# Patient Record
Sex: MALE | Race: OTHER | HISPANIC OR LATINO | Employment: FULL TIME | URBAN - METROPOLITAN AREA
[De-identification: names, ages, dates, MRNs, and addresses within clinical notes are randomized per-mention and may not be internally consistent; named-entity substitution may affect disease eponyms.]

---

## 2017-02-11 ENCOUNTER — HOSPITAL ENCOUNTER (OUTPATIENT)
Dept: LAB | Facility: MEDICAL CENTER | Age: 32
End: 2017-02-11
Attending: INTERNAL MEDICINE
Payer: COMMERCIAL

## 2017-02-11 DIAGNOSIS — E55.9 VITAMIN D DEFICIENCY: ICD-10-CM

## 2017-02-11 DIAGNOSIS — N46.9 INFERTILITY MALE: ICD-10-CM

## 2017-02-11 DIAGNOSIS — R19.7 DIARRHEA, UNSPECIFIED TYPE: ICD-10-CM

## 2017-02-11 DIAGNOSIS — N52.9 ERECTILE DYSFUNCTION, UNSPECIFIED ERECTILE DYSFUNCTION TYPE: ICD-10-CM

## 2017-02-11 LAB
25(OH)D3 SERPL-MCNC: 35 NG/ML (ref 30–100)
ALBUMIN SERPL BCP-MCNC: 4.4 G/DL (ref 3.2–4.9)
ALBUMIN/GLOB SERPL: 1.1 G/DL
ALP SERPL-CCNC: 73 U/L (ref 30–99)
ALT SERPL-CCNC: 71 U/L (ref 2–50)
ANION GAP SERPL CALC-SCNC: 9 MMOL/L (ref 0–11.9)
AST SERPL-CCNC: 35 U/L (ref 12–45)
BASOPHILS # BLD AUTO: 0.04 K/UL (ref 0–0.12)
BASOPHILS NFR BLD AUTO: 0.4 % (ref 0–1.8)
BILIRUB SERPL-MCNC: 0.8 MG/DL (ref 0.1–1.5)
BUN SERPL-MCNC: 13 MG/DL (ref 8–22)
CALCIUM SERPL-MCNC: 9.8 MG/DL (ref 8.5–10.5)
CHLORIDE SERPL-SCNC: 105 MMOL/L (ref 96–112)
CO2 SERPL-SCNC: 26 MMOL/L (ref 20–33)
CREAT SERPL-MCNC: 0.88 MG/DL (ref 0.5–1.4)
EOSINOPHIL # BLD: 0.15 K/UL (ref 0–0.51)
EOSINOPHIL NFR BLD AUTO: 1.7 % (ref 0–6.9)
ERYTHROCYTE [DISTWIDTH] IN BLOOD BY AUTOMATED COUNT: 43.8 FL (ref 35.9–50)
G LAMBLIA+C PARVUM AG STL QL RAPID: NORMAL
GLOBULIN SER CALC-MCNC: 4 G/DL (ref 1.9–3.5)
GLUCOSE SERPL-MCNC: 112 MG/DL (ref 65–99)
HCT VFR BLD AUTO: 53.6 % (ref 42–52)
HGB BLD-MCNC: 17.6 G/DL (ref 14–18)
IMM GRANULOCYTES # BLD AUTO: 0.04 K/UL (ref 0–0.11)
IMM GRANULOCYTES NFR BLD AUTO: 0.4 % (ref 0–0.9)
LYMPHOCYTES # BLD: 3.43 K/UL (ref 1–4.8)
LYMPHOCYTES NFR BLD AUTO: 38.2 % (ref 22–41)
MCH RBC QN AUTO: 30.5 PG (ref 27–33)
MCHC RBC AUTO-ENTMCNC: 32.8 G/DL (ref 33.7–35.3)
MCV RBC AUTO: 92.9 FL (ref 81.4–97.8)
MONOCYTES # BLD: 0.57 K/UL (ref 0–0.85)
MONOCYTES NFR BLD AUTO: 6.3 % (ref 0–13.4)
NEUTROPHILS # BLD: 4.75 K/UL (ref 1.82–7.42)
NEUTROPHILS NFR BLD AUTO: 53 % (ref 44–72)
NRBC # BLD AUTO: 0 K/UL
NRBC BLD-RTO: 0 /100 WBC
PLATELET # BLD AUTO: 345 K/UL (ref 164–446)
PMV BLD AUTO: 9.4 FL (ref 9–12.9)
POTASSIUM SERPL-SCNC: 4 MMOL/L (ref 3.6–5.5)
PROLACTIN SERPL-MCNC: 25.6 NG/ML (ref 2.1–17.7)
PROT SERPL-MCNC: 8.4 G/DL (ref 6–8.2)
RBC # BLD AUTO: 5.77 M/UL (ref 4.7–6.1)
SIGNIFICANT IND 70042: NORMAL
SODIUM SERPL-SCNC: 140 MMOL/L (ref 135–145)
SOURCE SOURCE: NORMAL
T4 FREE SERPL-MCNC: 0.86 NG/DL (ref 0.53–1.43)
TESTOST SERPL-MCNC: 348 NG/DL (ref 175–781)
TSH SERPL DL<=0.005 MIU/L-ACNC: 1.72 UIU/ML (ref 0.3–3.7)
WBC # BLD AUTO: 9 K/UL (ref 4.8–10.8)
WBC STL QL MICRO: NORMAL

## 2017-02-11 PROCEDURE — 84446 ASSAY OF VITAMIN E: CPT

## 2017-02-11 PROCEDURE — 36415 COLL VENOUS BLD VENIPUNCTURE: CPT

## 2017-02-11 PROCEDURE — 87045 FECES CULTURE AEROBIC BACT: CPT

## 2017-02-11 PROCEDURE — 84443 ASSAY THYROID STIM HORMONE: CPT

## 2017-02-11 PROCEDURE — 87899 AGENT NOS ASSAY W/OPTIC: CPT

## 2017-02-11 PROCEDURE — 84630 ASSAY OF ZINC: CPT

## 2017-02-11 PROCEDURE — 85025 COMPLETE CBC W/AUTO DIFF WBC: CPT

## 2017-02-11 PROCEDURE — 82306 VITAMIN D 25 HYDROXY: CPT

## 2017-02-11 PROCEDURE — 87328 CRYPTOSPORIDIUM AG IA: CPT

## 2017-02-11 PROCEDURE — 80053 COMPREHEN METABOLIC PANEL: CPT

## 2017-02-11 PROCEDURE — 87046 STOOL CULTR AEROBIC BACT EA: CPT

## 2017-02-11 PROCEDURE — 84146 ASSAY OF PROLACTIN: CPT

## 2017-02-11 PROCEDURE — 87329 GIARDIA AG IA: CPT

## 2017-02-11 PROCEDURE — 83516 IMMUNOASSAY NONANTIBODY: CPT | Mod: 91

## 2017-02-11 PROCEDURE — 84439 ASSAY OF FREE THYROXINE: CPT

## 2017-02-11 PROCEDURE — 82784 ASSAY IGA/IGD/IGG/IGM EACH: CPT

## 2017-02-11 PROCEDURE — 89055 LEUKOCYTE ASSESSMENT FECAL: CPT

## 2017-02-11 PROCEDURE — 84403 ASSAY OF TOTAL TESTOSTERONE: CPT

## 2017-02-12 LAB
E COLI SXT1+2 STL IA: NORMAL
SIGNIFICANT IND 70042: NORMAL
SOURCE SOURCE: NORMAL

## 2017-02-14 LAB
A-TOCOPHEROL VIT E SERPL-MCNC: 8.9 MG/L (ref 5.5–18)
BACTERIA STL CULT: NORMAL
BETA+GAMMA TOCOPHEROL SERPL-MCNC: 0.9 MG/L (ref 0–6)
E COLI SXT1+2 STL IA: NORMAL
IGA SERPL-MCNC: 525 MG/DL (ref 68–408)
SIGNIFICANT IND 70042: NORMAL
SOURCE SOURCE: NORMAL
TTG IGA SER IA-ACNC: 1 U/ML (ref 0–3)
ZINC BLD-MCNC: 722 UG/DL (ref 440–860)

## 2017-02-15 ENCOUNTER — OFFICE VISIT (OUTPATIENT)
Dept: MEDICAL GROUP | Facility: CLINIC | Age: 32
End: 2017-02-15
Payer: COMMERCIAL

## 2017-02-15 VITALS
DIASTOLIC BLOOD PRESSURE: 70 MMHG | HEART RATE: 70 BPM | WEIGHT: 165 LBS | TEMPERATURE: 98.7 F | OXYGEN SATURATION: 98 % | SYSTOLIC BLOOD PRESSURE: 112 MMHG | BODY MASS INDEX: 29.23 KG/M2 | HEIGHT: 63 IN | RESPIRATION RATE: 16 BRPM

## 2017-02-15 DIAGNOSIS — R79.89 ELEVATED PROLACTIN LEVEL: ICD-10-CM

## 2017-02-15 DIAGNOSIS — R73.9 ELEVATED BLOOD SUGAR: ICD-10-CM

## 2017-02-15 DIAGNOSIS — E55.9 VITAMIN D DEFICIENCY: ICD-10-CM

## 2017-02-15 DIAGNOSIS — R74.01 ELEVATED ALANINE AMINOTRANSFERASE (ALT) LEVEL: ICD-10-CM

## 2017-02-15 LAB
HBA1C MFR BLD: NORMAL % (ref ?–5.8)
INT CON NEG: NEGATIVE
INT CON POS: POSITIVE

## 2017-02-15 PROCEDURE — 83036 HEMOGLOBIN GLYCOSYLATED A1C: CPT | Performed by: PHYSICIAN ASSISTANT

## 2017-02-15 PROCEDURE — 99214 OFFICE O/P EST MOD 30 MIN: CPT | Performed by: PHYSICIAN ASSISTANT

## 2017-02-15 NOTE — PROGRESS NOTES
Subjective:   CC: Daryl Cheung is a 31 y.o. Male, Northern Irish-speaking, here today for lab follow up, and the following:      Last lab reveals elevated ALT. Patient denies any right upper quadrant pain. Patient does have chronic occasional abdominal discomfort with diarrhea that is not related to food. No diarrhea today. Tissue transglutaminase antibody results were negative at this point.   He has elevated prolactin, denies any breast discharge. I believe prolactin was ordered to evaluate patient's erectile dysfunction.  Patient had elevated fasting blood sugar with family history of diabetes. Denies any polyuria polydipsia or blurry vision.  Patient used to take 5000 vitamin D international units daily. However currently is not taking any vitamin D. Last vitamin D level--> 35 WNL.     Current medicines (including changes today)  Current Outpatient Prescriptions   Medication Sig Dispense Refill   • diphenoxylate-atropine (LOMOTIL) 2.5-0.025 MG Tab Take 2 Tabs by mouth 4 times a day as needed for Diarrhea. 24 Tab 1   • scopolamine (TRANSDERM-SCOP) 1.5 MG/3DAYS PATCH 72 HR Apply 1 Patch to skin as directed every 72 hours. 4 Patch 0   • albuterol (PROAIR HFA) 108 (90 BASE) MCG/ACT Aero Soln inhalation aerosol Inhale 2 Puffs by mouth every four hours as needed for Shortness of Breath. 8.5 g 0   • promethazine-codeine (PHENERGAN-CODEINE) 6.25-10 MG/5ML Syrup Take 5-10 mL by mouth 3 times a day as needed for Cough (or use qhs). 150 mL 0   • aluminum chloride (DRYSOL) 20 % external solution Apply at night 2-3 days or until sweating has stopped. Avoid using non-broken, irritated or recently shaved the skin. 1 Bottle 3     No current facility-administered medications for this visit.         Past medical, surgical, family, and social history are reviewed in Epic chart by me today.   Medications and allergies reviewed in Epic chart by me today.         ROS   No chest pain, no shortness of breath,   As documented  "in history of present illness above     Objective:     Blood pressure 112/70, pulse 70, temperature 37.1 °C (98.7 °F), resp. rate 16, height 1.6 m (5' 3\"), weight 74.844 kg (165 lb), SpO2 98 %. Body mass index is 29.24 kg/(m^2).   Physical Exam:  Constitutional: Alert, oriented in no acute distress.  Psych: Eye contact is good, speech goal directed, affect calm  Eyes: Conjunctiva non-injected, sclera non-icteric.  Lungs: Unlabored respiratory effort, clear to auscultation bilaterally with good excursion, no wheez or rhonci  CV: regular rate and rhythm. No lower extremity edema  Skin: no lesions in visible areas.        Assessment and Plan:   The following treatment plan was discussed    1. Elevated prolactin level (CMS-[facility])  Prolactin was elevated. Repeat labs in 12 weeks to reevaluate  - PROLACTIN    2. Elevated alanine aminotransferase (ALT) level  - COMP METABOLIC PANEL; Future    3. Elevated blood sugar  - COMP METABOLIC PANEL; Future  - POCT  A1C --> 5.9    4. Vitamin D deficiency  Resolved. Vitamin D level is back to normal.       Followup: Return if symptoms worsen or fail to improve.         Please note that this dictation was created using voice recognition software. I have made every reasonable attempt to correct obvious errors, but I expect that there are errors of grammar and possibly content that I did not discover before finalizing the note.    "

## 2017-02-15 NOTE — MR AVS SNAPSHOT
"        Daryl Stewart Perez   2/15/2017 7:45 AM   Office Visit   MRN: [id number]    Department:  [facility]   Dept Phone:  [phone]    Description:  Male : 1985   Provider:  Helga Victoria PA-C           Reason for Visit     Follow-Up [facility] Labs       Allergies as of 2/15/2017     No Known Allergies      You were diagnosed with     Elevated prolactin level (CMS-[facility])   [468799]       Elevated alanine aminotransferase (ALT) level   [837206]       Elevated blood sugar   [765119]       Vitamin D deficiency   [7898501]         Vital Signs     Blood Pressure Pulse Temperature Respirations Height Weight    112/70 mmHg 70 37.1 °C (98.7 °F) 16 1.6 m (5' 3\") 74.844 kg (165 lb)    Body Mass Index Oxygen Saturation Smoking Status             29.24 kg/m2 98% Never Smoker          Basic Information     Date Of Birth Sex Race Ethnicity Preferred Language    1985 Male Other  Origin (Wol[place],Citizen of [place]e,[place]anian, etc) English      Problem List              ICD-10-CM Priority Class Noted - Resolved    Family history of diabetes mellitus Z83.3   2015 - Present    Gastroesophageal reflux disease without esophagitis K21.9   2015 - Present      Health Maintenance        Date Due Completion Dates    IMM DTaP/Tdap/Td Vaccine (1 - Tdap) 2003 ---    IMM INFLUENZA (1) 2016 ---            Results     POCT  A1C      Component    Glycohemoglobin    5.9%    Internal Control Negative    Negative    Internal Control Positive    Positive                        Current Immunizations     No immunizations on file.      Below and/or attached are the medications your provider expects you to take. Review all of your home medications and newly ordered medications with your provider and/or pharmacist. Follow medication instructions as directed by your provider and/or pharmacist. Please keep your medication list with you and share with your provider. Update the information when " medications are discontinued, doses are changed, or new medications (including over-the-counter products) are added; and carry medication information at all times in the event of emergency situations     Allergies:  No Known Allergies          Medications  Valid as of: February 15, 2017 -  8:57 AM    Generic Name Brand Name Tablet Size Instructions for use    Albuterol Sulfate (Aero Soln) albuterol 108 (90 BASE) MCG/ACT Inhale 2 Puffs by mouth every four hours as needed for Shortness of Breath.        Aluminum Chloride (Solution) DRYSOL 20 % Apply at night 2-3 days or until sweating has stopped. Avoid using non-broken, irritated or recently shaved the skin.        Diphenoxylate-Atropine (Tab) LOMOTIL 2.5-0.025 MG Take 2 Tabs by mouth 4 times a day as needed for Diarrhea.        Promethazine-Codeine (Syrup) PHENERGAN-CODEINE 6.25-10 MG/5ML Take 5-10 mL by mouth 3 times a day as needed for Cough (or use qhs).        Scopolamine Base (PATCH 72 HR) TRANSDERM-SCOP 1.5 MG/3DAYS Apply 1 Patch to skin as directed every 72 hours.        .                 Medicines prescribed today were sent to:     [facility] [id number] [address] [address]    Phone: [phone] Fax: [phone]    Open 24 Hours?: No      Medication refill instructions:       If your prescription bottle indicates you have medication refills left, it is not necessary to call your provider’s office. Please contact your pharmacy and they will refill your medication.    If your prescription bottle indicates you do not have any refills left, you may request refills at any time through one of the following ways: The online Cloud[employer] system (except Urgent Care), by calling your provider’s office, or by asking your pharmacy to contact your provider’s office with a refill request. Medication refills are processed only during regular business hours and may not be available until the next business day. Your provider may request  additional information or to have a follow-up visit with you prior to refilling your medication.   *Please Note: Medication refills are assigned a new Rx number when refilled electronically. Your pharmacy may indicate that no refills were authorized even though a new prescription for the same medication is available at the pharmacy. Please request the medicine by name with the pharmacy before contacting your provider for a refill.        Your To Do List     Future Labs/Procedures Complete By Expires    COMP METABOLIC PANEL  As directed 2/16/2018         Forensic Logic Access Code: [email or web address]  Expires: 3/2/2017 12:17 PM    Forensic Logic  A secure, online tool to manage your health information     [facility]’s Forensic Logic® is a secure, online tool that connects you to your personalized health information from the privacy of your home -- day or night - making it very easy for you to manage your healthcare. Once the activation process is completed, you can even access your medical information using the Forensic Logic nahun, which is available for free in the Apple Nahun store or Google Play store.     Forensic Logic provides the following levels of access (as shown below):   My Chart Features   [facility] Doctor [facility]  Special[facility]  Urgent  Care [facility]  Primary Care  Doctor   Email your healthcare team securely and privately 24/7 X X X    Manage appointments: schedule your next appointment; view details of past/upcoming appointments X      Request prescription refills. X      View recent personal medical records, including lab and immunizations X X X X   View health record, including health history, allergies, medications X X X X   Read reports about your outpatient visits, procedures, consult and ER notes X X X X   See your discharge summary, which is a recap of your hospital and/or ER visit that includes your diagnosis, lab results, and care plan. X X       How to register for Forensic Logic:  1. Go to   [email or web address].  2. Click on the Sign Up Now box, which takes you to the New Member Sign Up page. You will need to provide the following information:  a. Enter your Macromill Access Code exactly as it appears at the top of this page. (You will not need to use this code after you’ve completed the sign-up process. If you do not sign up before the expiration date, you must request a new code.)   b. Enter your date of birth.   c. Enter your home email address.   d. Click Submit, and follow the next screen’s instructions.  3. Create a Macromill ID. This will be your Macromill login ID and cannot be changed, so think of one that is secure and easy to remember.  4. Create a pic5t password. You can change your password at any time.  5. Enter your Password Reset Question and Answer. This can be used at a later time if you forget your password.   6. Enter your e-mail address. This allows you to receive e-mail notifications when new information is available in Macromill.  7. Click Sign Up. You can now view your health information.    For assistance activating your Macromill account, call [phone]

## 2017-02-16 ENCOUNTER — TELEPHONE (OUTPATIENT)
Dept: MEDICAL GROUP | Facility: CLINIC | Age: 32
End: 2017-02-16

## 2017-02-17 NOTE — TELEPHONE ENCOUNTER
Pt's wife called back.    Per pt's wife pt was seen yesterday 2/15/17 by Helga to discuss labs and treatment.    Pt's also wanted to inform Dr. Townsend, that patient was tested for his blood sugar and his rusults came back normal.

## 2017-02-17 NOTE — TELEPHONE ENCOUNTER
----- Message from Brittani Townsend M.D. sent at 2/15/2017  5:51 PM PST -----  Please inform patient of abnormal labs, make appointment to be seen for evaluation and discussion.

## 2021-07-09 ENCOUNTER — HOSPITAL ENCOUNTER (EMERGENCY)
Facility: MEDICAL CENTER | Age: 36
End: 2021-07-09
Payer: COMMERCIAL

## 2021-07-09 VITALS
WEIGHT: 173.94 LBS | BODY MASS INDEX: 30.82 KG/M2 | SYSTOLIC BLOOD PRESSURE: 115 MMHG | RESPIRATION RATE: 16 BRPM | OXYGEN SATURATION: 96 % | DIASTOLIC BLOOD PRESSURE: 69 MMHG | HEART RATE: 67 BPM | TEMPERATURE: 97.7 F | HEIGHT: 63 IN

## 2021-07-09 PROCEDURE — 302449 STATCHG TRIAGE ONLY (STATISTIC)

## 2021-07-10 NOTE — ED TRIAGE NOTES
"Chief Complaint   Patient presents with   • Heat Exposure     Per pt family member, pt works construction and outside most times and symptoms seem to be worsened during extended outside heat exposure.   • Altered Mental Status     Per pt family member, pt experience acute confusion today and an episode last month where hismemory was impaired. Per pt family member \"he couldn't remember his phone password or the hosue code and he was getting confused about multiple other things.\"   • Nausea     Pt reports frequent nausea in the morning       Ambulatory to triage for above complaint.   Educated on triage process, encourage to inform staff of any changes.     /90   Pulse 65   Temp 36.2 °C (97.2 °F) (Temporal)   Resp 14   Ht 1.6 m (5' 3\")   Wt 78.9 kg (173 lb 15.1 oz)   SpO2 97% Comment: WES  BMI 30.81 kg/m²     "

## 2021-07-29 ENCOUNTER — HOSPITAL ENCOUNTER (OUTPATIENT)
Dept: LAB | Facility: MEDICAL CENTER | Age: 36
End: 2021-07-29
Attending: UROLOGY
Payer: COMMERCIAL

## 2021-07-29 LAB
FSH SERPL-ACNC: 4.8 MIU/ML (ref 1.5–12.4)
LH SERPL-ACNC: 7.3 IU/L (ref 1.7–8.6)
PROLACTIN SERPL-MCNC: 12.6 NG/ML (ref 2.1–17.7)

## 2021-07-29 PROCEDURE — 84403 ASSAY OF TOTAL TESTOSTERONE: CPT

## 2021-07-29 PROCEDURE — 84270 ASSAY OF SEX HORMONE GLOBUL: CPT

## 2021-07-29 PROCEDURE — 84146 ASSAY OF PROLACTIN: CPT

## 2021-07-29 PROCEDURE — 83002 ASSAY OF GONADOTROPIN (LH): CPT

## 2021-07-29 PROCEDURE — 84402 ASSAY OF FREE TESTOSTERONE: CPT

## 2021-07-29 PROCEDURE — 83001 ASSAY OF GONADOTROPIN (FSH): CPT

## 2021-07-29 PROCEDURE — 36415 COLL VENOUS BLD VENIPUNCTURE: CPT

## 2021-07-31 LAB
SHBG SERPL-SCNC: 31 NMOL/L (ref 11–80)
TESTOST FREE MFR SERPL: 2 % (ref 1.6–2.9)
TESTOST FREE SERPL-MCNC: 110 PG/ML (ref 47–244)
TESTOST SERPL-MCNC: 555 NG/DL (ref 300–1080)

## 2022-01-11 ENCOUNTER — HOSPITAL ENCOUNTER (OUTPATIENT)
Dept: LAB | Facility: MEDICAL CENTER | Age: 37
End: 2022-01-11
Attending: FAMILY MEDICINE
Payer: COMMERCIAL

## 2022-01-11 LAB
ALBUMIN SERPL BCP-MCNC: 4.4 G/DL (ref 3.2–4.9)
ALBUMIN/GLOB SERPL: 1.3 G/DL
ALP SERPL-CCNC: 94 U/L (ref 30–99)
ALT SERPL-CCNC: 35 U/L (ref 2–50)
ANION GAP SERPL CALC-SCNC: 12 MMOL/L (ref 7–16)
AST SERPL-CCNC: 23 U/L (ref 12–45)
BILIRUB SERPL-MCNC: 0.2 MG/DL (ref 0.1–1.5)
BUN SERPL-MCNC: 13 MG/DL (ref 8–22)
CALCIUM SERPL-MCNC: 8.6 MG/DL (ref 8.5–10.5)
CHLORIDE SERPL-SCNC: 105 MMOL/L (ref 96–112)
CO2 SERPL-SCNC: 24 MMOL/L (ref 20–33)
CREAT SERPL-MCNC: 0.78 MG/DL (ref 0.5–1.4)
GLOBULIN SER CALC-MCNC: 3.4 G/DL (ref 1.9–3.5)
GLUCOSE SERPL-MCNC: 104 MG/DL (ref 65–99)
POTASSIUM SERPL-SCNC: 4.3 MMOL/L (ref 3.6–5.5)
PROT SERPL-MCNC: 7.8 G/DL (ref 6–8.2)
SODIUM SERPL-SCNC: 141 MMOL/L (ref 135–145)

## 2022-01-11 PROCEDURE — 80053 COMPREHEN METABOLIC PANEL: CPT

## 2022-01-11 PROCEDURE — 36415 COLL VENOUS BLD VENIPUNCTURE: CPT
